# Patient Record
Sex: MALE | NOT HISPANIC OR LATINO | URBAN - METROPOLITAN AREA
[De-identification: names, ages, dates, MRNs, and addresses within clinical notes are randomized per-mention and may not be internally consistent; named-entity substitution may affect disease eponyms.]

---

## 2017-10-30 ENCOUNTER — IMPORTED ENCOUNTER (OUTPATIENT)
Dept: URBAN - METROPOLITAN AREA CLINIC 1 | Facility: CLINIC | Age: 72
End: 2017-10-30

## 2017-10-30 PROBLEM — H16.143: Noted: 2017-10-30

## 2017-10-30 PROBLEM — H40.023: Noted: 2017-10-30

## 2017-10-30 PROBLEM — H10.45: Noted: 2017-10-30

## 2017-10-30 PROBLEM — H04.123: Noted: 2017-10-30

## 2017-10-30 PROBLEM — H43.813: Noted: 2017-10-30

## 2017-10-30 PROBLEM — Z96.1: Noted: 2017-10-30

## 2017-10-30 PROBLEM — H26.493: Noted: 2017-10-30

## 2017-10-30 PROBLEM — R73.09: Noted: 2017-10-30

## 2017-10-30 PROCEDURE — 92014 COMPRE OPH EXAM EST PT 1/>: CPT

## 2017-10-30 PROCEDURE — 92083 EXTENDED VISUAL FIELD XM: CPT

## 2017-10-30 NOTE — PATIENT DISCUSSION
1.  DM Type II (Diet Controlled) without sign of diabetic retinopathy and no blot heme on dilated retinal examination today OU No Macular Edema:  Discussed the pathophysiology of diabetes and its effect on the eye and risk of blindness. Stressed the importance of strong glucose control. Advised of importance of at least yearly dilated examinations but to contact us immediately for any problems or concerns. 2. STEPHANIE w/ PEK OU- Use ATs TID OU Routinely. Consider Plugs without improvement. 3.  Allergic Conjunctivitis OU- Observe. 4.  PVD OU - RD precautions. 5.  PCO OU: (Posterior Capsule Opacification)   Observe  6. Pseudophakia OU - (Standard OU) 7. COAG Suspect OU (CD 0.85 OU) Neg Fm Hx. Risk of cupping. VF WNL OU Today. Will continue to observe on no meds. Return for an appointment in 6 mo 10 OCT (Check K) with Dr. Shoaib Allan.

## 2018-04-30 ENCOUNTER — IMPORTED ENCOUNTER (OUTPATIENT)
Dept: URBAN - METROPOLITAN AREA CLINIC 1 | Facility: CLINIC | Age: 73
End: 2018-04-30

## 2018-04-30 PROBLEM — H26.493: Noted: 2018-04-30

## 2018-04-30 PROBLEM — H10.45: Noted: 2018-04-30

## 2018-04-30 PROBLEM — H40.023: Noted: 2018-04-30

## 2018-04-30 PROBLEM — H16.143: Noted: 2018-04-30

## 2018-04-30 PROBLEM — Z96.1: Noted: 2018-04-30

## 2018-04-30 PROBLEM — H04.123: Noted: 2018-04-30

## 2018-04-30 PROCEDURE — 92133 CPTRZD OPH DX IMG PST SGM ON: CPT

## 2018-04-30 PROCEDURE — 92012 INTRM OPH EXAM EST PATIENT: CPT

## 2018-04-30 NOTE — PATIENT DISCUSSION
1.  Glaucoma Suspect OU (0.85 OU): Stable IOP OU. Essentially normal OCT OU. Patient is considered high risk. Condition was discussed with patient and patient understands. Will continue to monitor patient for any progression in condition. Patient was advised to call us with any problems questions or concerns. 2.  STEPHANIE w/ PEK OU-The increase of artificial tears OU to TID were recommended. Consider plugs if no improvement3. Pseudophakia OU- Doing well. 4.  PCO OU: Observe and consider yag cap when pt feels pco visually significant and visual acuity decreases to appropriate level. 5. Allergic Conjunctivitis OU- Controlled. The condition was  discussed with the patient. Avoidance of allergens and cool compresses were recommended. 6. H/o DM w/o DR Jalen Belcher. Return for an appointment for a 30/HVF in 6 months with Dr. Shoaib Allan.

## 2018-04-30 NOTE — PATIENT DISCUSSION
PCO OU: (Posterior Capsule Opacification)   Observe and consider yag cap when pt feels pco visually significant and visual acuity decreases to appropriate level.

## 2018-05-22 NOTE — PATIENT DISCUSSION
PITUITARY ADENOMA COUNSELING:  I have explained the nature of pituitary adenoma and the potential for permanent visual loss from compressive optic neuropathy. The patient is instructed to continue regular visits to the eye doctor for testing of visual function.

## 2018-10-30 ENCOUNTER — IMPORTED ENCOUNTER (OUTPATIENT)
Dept: URBAN - METROPOLITAN AREA CLINIC 1 | Facility: CLINIC | Age: 73
End: 2018-10-30

## 2018-10-30 PROBLEM — H02.831: Noted: 2018-10-30

## 2018-10-30 PROBLEM — E11.9: Noted: 2018-10-30

## 2018-10-30 PROBLEM — H40.013: Noted: 2018-10-30

## 2018-10-30 PROBLEM — Z96.1: Noted: 2018-10-30

## 2018-10-30 PROBLEM — H04.123: Noted: 2018-10-30

## 2018-10-30 PROBLEM — Z79.84: Noted: 2018-10-30

## 2018-10-30 PROBLEM — H43.813: Noted: 2018-10-30

## 2018-10-30 PROBLEM — H02.834: Noted: 2018-10-30

## 2018-10-30 PROBLEM — H26.491: Noted: 2018-10-30

## 2018-10-30 PROBLEM — H10.45: Noted: 2018-10-30

## 2018-10-30 PROBLEM — H16.143: Noted: 2018-10-30

## 2018-10-30 PROBLEM — H26.493: Noted: 2018-10-30

## 2018-10-30 PROCEDURE — 92014 COMPRE OPH EXAM EST PT 1/>: CPT

## 2018-10-30 PROCEDURE — 92083 EXTENDED VISUAL FIELD XM: CPT

## 2018-10-30 NOTE — PATIENT DISCUSSION
1.  DM Type II without sign of diabetic retinopathy and no blot heme on dilated retinal examination today OU No Macular Edema: Stable. Discussed the pathophysiology of diabetes and its effect on the eye and risk of blindness. Stressed the importance of strong glucose control. Advised of importance of at least yearly dilated examinations but to contact us immediately for any problems or concerns. 2. Type II diabetes controlled by oral medications. 3.  Glaucoma Suspect OU (0.85 OU): Stable IOP and c/D OU. Normal HVF OS. HVF defect OD likely artifact. Patient is considered Low Risk. Condition was discussed with patient and patient understands. Will continue to monitor patient for any progression in condition. Patient was advised to call us with any problems questions or concerns. 4.  STEPHANIE w/ PEK OU- No improvement. The increase of artificial tears were recommended. Consider plugs w/o improvement. 5. PCO OD>OS: Observe and consider yag cap when pt feels pco visually significant and visual acuity decreases to appropriate level. 6. Allergic Conjunctivitis OU- The condition was  discussed with the patient. Avoidance of allergens and cool compresses were recommended. 7. PVD OU - Old stable 8. Dermatochalasis OU UL's  - Follow with no intervention at this time. 9. Pseudophakia OU - Doing well.

## 2019-04-30 ENCOUNTER — IMPORTED ENCOUNTER (OUTPATIENT)
Dept: URBAN - METROPOLITAN AREA CLINIC 1 | Facility: CLINIC | Age: 74
End: 2019-04-30

## 2019-04-30 PROBLEM — H10.45: Noted: 2019-04-30

## 2019-04-30 PROBLEM — H02.834: Noted: 2019-04-30

## 2019-04-30 PROBLEM — H02.831: Noted: 2019-04-30

## 2019-04-30 PROBLEM — H26.493: Noted: 2019-04-30

## 2019-04-30 PROBLEM — H04.123: Noted: 2019-04-30

## 2019-04-30 PROBLEM — Z96.1: Noted: 2019-04-30

## 2019-04-30 PROBLEM — H16.143: Noted: 2019-04-30

## 2019-04-30 PROBLEM — H40.013: Noted: 2019-04-30

## 2019-04-30 PROCEDURE — 99213 OFFICE O/P EST LOW 20 MIN: CPT

## 2019-04-30 NOTE — PATIENT DISCUSSION
1.  STEPHANIE w/ PEK OU- Improved cont ATs TID OU routinely 2. Glaucoma Suspect OU (CD 0.85 OU):  Patient is considered Low Risk. 3.  Allergic Conjunctivitis OU -- The condition was  discussed with the patient. Avoidance of allergens and cool compresses were recommended. 4. PCO OU: (Posterior Capsule Opacification)   Observe and consider yag cap when pt feels pco visually significant and visual acuity decreases to appropriate level. 5. Pseudophakia OU - Doing Well 6. Dermatochalasis OU UL's  - Follow with no intervention at this time. 7. H/o DM w/o DR Liliam Arambula. H/o PVD OUReturn for an appointment in October 30/OCT with Dr. Sheela Mcnally.

## 2019-10-10 ENCOUNTER — IMPORTED ENCOUNTER (OUTPATIENT)
Dept: URBAN - METROPOLITAN AREA CLINIC 1 | Facility: CLINIC | Age: 74
End: 2019-10-10

## 2019-10-10 PROBLEM — Z79.84: Noted: 2019-10-10

## 2019-10-10 PROBLEM — H04.123: Noted: 2019-10-10

## 2019-10-10 PROBLEM — H40.013: Noted: 2019-10-10

## 2019-10-10 PROBLEM — E11.9: Noted: 2019-10-10

## 2019-10-10 PROBLEM — H16.143: Noted: 2019-10-10

## 2019-10-10 PROCEDURE — 92014 COMPRE OPH EXAM EST PT 1/>: CPT

## 2019-10-10 PROCEDURE — 92133 CPTRZD OPH DX IMG PST SGM ON: CPT

## 2019-10-10 NOTE — PATIENT DISCUSSION
1.  DM Type II (Oral Meds) without sign of diabetic retinopathy and no blot heme on dilated retinal examination today OU No Macular Edema:  Discussed the pathophysiology of diabetes and its effect on the eye and risk of blindness. Stressed the importance of strong glucose control. Advised of importance of at least yearly dilated examinations but to contact us immediately for any problems or concerns. 2. Glaucoma Suspect OU (CD 0.85 OU) Neg Fm Hx. Risk of cupping. OCT WNL OU today IOP WNL on no gtts. Cont to observe off gtts. 3.  STEPHANIE w/ increased PEK OU- Increase ATs to QID OU Routinely. Consider Plugs vs. Restasis w/o improvement. 4.  Allergic Conjunctivitis OU -- Cont Zaditor BID OU PRN for allergies. 5.  PCO OU: (Posterior Capsule Opacification)   Observe  6. Pseudophakia OU - Doing Well 7. Dermatochalasis OU UL's  - observe. 8.  PVD OU- stable RD precautions. Letter to PCP MRx deferred Return for an appointment in 6 mo 10 (Check K) with Dr. Judy Kumar.

## 2020-04-14 ENCOUNTER — IMPORTED ENCOUNTER (OUTPATIENT)
Dept: URBAN - METROPOLITAN AREA CLINIC 1 | Facility: CLINIC | Age: 75
End: 2020-04-14

## 2020-04-14 PROBLEM — H04.123: Noted: 2020-04-14

## 2020-04-14 PROBLEM — H16.143: Noted: 2020-04-14

## 2020-04-14 PROBLEM — H10.45: Noted: 2020-04-14

## 2020-04-14 PROCEDURE — 99213 OFFICE O/P EST LOW 20 MIN: CPT

## 2020-04-14 NOTE — PATIENT DISCUSSION
1.   STEPHANIE w/  PEK OU -- PEK improved with compliance. Cont ATs to QID OU Routinely. Hold on Plugs/ Restasis at this time. 2.  Allergic Conjunctivitis OU -- Cont Zaditor BID OU PRN for allergies. 3.  PCO OU: (Posterior Capsule Opacification)   Observe  4. Pseudophakia OU - Doing Well 5. Dermatochalasis OU UL's  - observe. 6.  H/o PVD OU 7. H/o DM Type II (Oral Meds) without sign of diabetic retinopathy and no blot heme on dilated retinal examination today OU No Macular Edema 8. Glaucoma Suspect OU (CD 0.85 OU) Neg Fm Hx. Risk of cupping. Cont to observe off gtts. Return for an appointment in 6 mo 30/OCT with Dr. Alla Sales.

## 2020-11-09 ENCOUNTER — IMPORTED ENCOUNTER (OUTPATIENT)
Dept: URBAN - METROPOLITAN AREA CLINIC 1 | Facility: CLINIC | Age: 75
End: 2020-11-09

## 2020-11-09 PROBLEM — H16.143: Noted: 2020-11-09

## 2020-11-09 PROBLEM — H40.013: Noted: 2020-11-09

## 2020-11-09 PROBLEM — E11.9: Noted: 2020-11-09

## 2020-11-09 PROBLEM — Z79.84: Noted: 2020-11-09

## 2020-11-09 PROBLEM — H04.123: Noted: 2020-11-09

## 2020-11-09 PROCEDURE — 92014 COMPRE OPH EXAM EST PT 1/>: CPT

## 2020-11-09 PROCEDURE — 92133 CPTRZD OPH DX IMG PST SGM ON: CPT

## 2020-11-09 NOTE — PATIENT DISCUSSION
1.  DM Type II (Oral Meds) without sign of diabetic retinopathy and no blot heme on dilated retinal examination today OU No Macular Edema:  Discussed the pathophysiology of diabetes and its effect on the eye and risk of blindness. Stressed the importance of strong glucose control. Advised of importance of at least yearly dilated examinations but to contact us immediately for any problems or concerns. 2. Glaucoma Suspect OU (CD 0.85 OU) - OCT WNL OU. Neg Fm Hx. Risk of cupping. Cont to observe off gtts. Patient is considered Low Risk. Condition was discussed with patient and patient understands. Will continue to monitor patient for any progression in condition. Patient was advised to call us with any problems questions or concerns. 3.  STEPHANIE w/ PEK OU - Recommend ATs QID OU Routinely. Hold on Plugs/ Restasis at this time. Add Restasis at next visit w/o improvement. 4.  Allergic Conjunctivitis OU -- Cont Zaditor BID OU PRN for allergies. 5.  PCO OU: (Posterior Capsule Opacification) - Observe. 6.  Pseudophakia OU - Doing Well 7. PVD OU - old stable. RD precautions. 8. Dermatochalasis OU UL's  - observe. \ Patient deferred [facility] Rx today. Return for an appointment in 6 months 10 (k check) with Dr. Rafi Briscoe.

## 2021-05-10 ENCOUNTER — IMPORTED ENCOUNTER (OUTPATIENT)
Dept: URBAN - METROPOLITAN AREA CLINIC 1 | Facility: CLINIC | Age: 76
End: 2021-05-10

## 2021-05-10 PROBLEM — H04.123: Noted: 2021-05-10

## 2021-05-10 PROBLEM — H16.143: Noted: 2021-05-10

## 2021-05-10 PROCEDURE — 92012 INTRM OPH EXAM EST PATIENT: CPT

## 2021-05-10 NOTE — PATIENT DISCUSSION
1.  STEPHANIE w/ PEK OU -- PEK persist despite compliance with routine use of ATs. Begin Restasis BID OU. Recommend instill AT prior to Restasis dose. Cont ATs QID OU Routinely. 2.  Allergic Conjunctivitis OU -- Cont Zaditor BID OU PRN for allergies. 3.  PCO OU -- (Posterior Capsule Opacification) Observe. 4.  Pseudophakia OU -- Doing Well 5. Glaucoma Suspect OU (CD 0.85 OU) -- Neg Fm Hx. Risk of cupping. Cont to observe off gtts. Patient is considered Low Risk. 6.  H/o DM Type II (Oral Meds) w/o  7.  H/o PVD OU 8. Dermatochalasis OU UL's  -- Observe Return for an appointment in 3 months 10 (k check on restasis *NEW*) with Dr. Aline Martinez.

## 2021-08-12 ENCOUNTER — IMPORTED ENCOUNTER (OUTPATIENT)
Dept: URBAN - METROPOLITAN AREA CLINIC 1 | Facility: CLINIC | Age: 76
End: 2021-08-12

## 2021-08-12 PROBLEM — H04.123: Noted: 2021-08-12

## 2021-08-12 PROBLEM — E11.9: Noted: 2021-08-12

## 2021-08-12 PROBLEM — H16.143: Noted: 2021-08-12

## 2021-08-12 PROBLEM — E11.3292: Noted: 2021-08-12

## 2021-08-12 PROBLEM — H26.493: Noted: 2021-08-12

## 2021-08-12 PROCEDURE — 92012 INTRM OPH EXAM EST PATIENT: CPT

## 2021-08-12 PROCEDURE — 92015 DETERMINE REFRACTIVE STATE: CPT

## 2021-08-12 NOTE — PATIENT DISCUSSION
1.  STEPHANIE w/ PEK OU -- PEK much improved OU on Restasis. Continue Restasis BID OU and PF ATs QID OU routinely. *Recommend instilling AT prior to Restasis. 2.  PCO OU -- Visually Significant *secondary to glare*; schedule YAG Cap. Pros cons risks and benefits. 3.  DM Type II (Oral Med) with Mild Nonproliferative Diabetic Retinopathy OS No Macular Edema: MAs seen with today's exam (new). Discussed the pathophysiology of diabetes and its effect on the eye and risk of blindness. Stressed the importance of strong glucose control. Advised of importance of at least yearly dilated examinations but to contact us immediately for any problems or concerns. 4.  DM Type II (Oral Med) without sign of diabetic retinopathy and no blot heme on dilated retinal examination today OD No Macular Edema:  Discussed the pathophysiology of diabetes and its effect on the eye and risk of blindness. Stressed the importance of strong glucose control. Advised of importance of at least yearly dilated examinations but to contact us immediately for any problems or concerns. 3. Allergic Conjunctivitis OU -- Cont Zaditor BID OU PRN for allergies. 4.  Pseudophakia OU -- Doing Well 5. Glaucoma Suspect OU (CD 0.85 OU) -- Neg Fm Hx. Risk of cupping. Cont to observe off gtts. Patient is considered Low Risk. 7.  PVD OU 8. Dermatochalasis OU UL's  -- Observe Return for an appointment YAG Cap OD then OS with Dr. Byron Gomez.

## 2021-08-12 NOTE — PATIENT DISCUSSION
DM Type II without sign of diabetic retinopathy and no blot heme on dilated retinal examination today OD No Macular Edema:  Discussed the pathophysiology of diabetes and its effect on the eye and risk of blindness. Stressed the importance of strong glucose control. Advised of importance of at least yearly dilated examinations but to contact us immediately for any problems or concerns.

## 2021-09-10 ENCOUNTER — IMPORTED ENCOUNTER (OUTPATIENT)
Dept: URBAN - METROPOLITAN AREA CLINIC 1 | Facility: CLINIC | Age: 76
End: 2021-09-10

## 2021-09-10 PROBLEM — H26.491: Noted: 2021-09-10

## 2021-09-10 PROCEDURE — 66821 AFTER CATARACT LASER SURGERY: CPT

## 2021-10-01 ENCOUNTER — IMPORTED ENCOUNTER (OUTPATIENT)
Dept: URBAN - METROPOLITAN AREA CLINIC 1 | Facility: CLINIC | Age: 76
End: 2021-10-01

## 2021-10-01 PROBLEM — H26.492: Noted: 2021-10-01

## 2021-10-01 PROCEDURE — 66821 AFTER CATARACT LASER SURGERY: CPT

## 2021-10-01 NOTE — PATIENT DISCUSSION
YAG CAP OS: (Consent signed and scanned into attachments) 1 gtt Prolensa applied. The purpose and nature of the procedure possible alternative methods of treatment the risks involved and the possibility of complications were discussed with patient. The Patient wishes to proceed and the consent was signed. The laser was then performed under topical anesthesia with no complications. Post op instructions were given to patient as well as a follow-up appointment. Patient was advised to call our office if any questions or concerns. Return for an appointment in 1-4 weeks po/yag with Dr. Kyra Castellanos.

## 2021-10-29 ENCOUNTER — IMPORTED ENCOUNTER (OUTPATIENT)
Dept: URBAN - METROPOLITAN AREA CLINIC 1 | Facility: CLINIC | Age: 76
End: 2021-10-29

## 2021-10-29 PROCEDURE — 99024 POSTOP FOLLOW-UP VISIT: CPT

## 2021-10-29 NOTE — PATIENT DISCUSSION
PO YAG Cap OU: good result. MRX given. Return for an appointment in 4 months 27 with Dr. Bennie Johns.

## 2021-12-01 ENCOUNTER — IMPORTED ENCOUNTER (OUTPATIENT)
Dept: URBAN - METROPOLITAN AREA CLINIC 1 | Facility: CLINIC | Age: 76
End: 2021-12-01

## 2021-12-01 PROBLEM — B30.9: Noted: 2021-12-01

## 2021-12-01 PROCEDURE — 92012 INTRM OPH EXAM EST PATIENT: CPT

## 2021-12-01 NOTE — PATIENT DISCUSSION
1.  Viral Conjunctivitis OU -- Increase PF ATs to Q2Hrs OU. Begin Pred TID OU (Erx'd). The condition was discussed with the patient. Cool compresses and artificial tears were recommended. The mechanism of transmission was explained and the patient was educated to wash hands and use separate towels and bedding. 2. STEPHANIE w/ PEK OU -- Hold on Restasis BID OU. Increase PF ATs to Q2Hrs OU routinely. 3.  Allergic Conjunctivitis OU -- Cont Zaditor BID OU PRN for allergies. 4.  Pseudophakia OU -- H/o YAG Cap OU 5.  Glaucoma Suspect OU (CD 0.85 OU) -- Neg Fm Hx. Risk of cupping. Cont to observe off gtts. Patient is considered Low Risk. 6.  Dermatochalasis OU UL's  -- Observe 7. H/o DM Type II (Oral Med) w/o DR OD 8. H/o DM Type II (Oral Med) with Mild NPDR OS9.  H/o PVD OU Return for an appointment in 1 week 10 with Dr. Bobo Awan.

## 2021-12-08 ENCOUNTER — IMPORTED ENCOUNTER (OUTPATIENT)
Dept: URBAN - METROPOLITAN AREA CLINIC 1 | Facility: CLINIC | Age: 76
End: 2021-12-08

## 2021-12-08 PROBLEM — B30.9: Noted: 2021-12-08

## 2021-12-08 PROBLEM — INACTIVE: Noted: 2021-12-08

## 2021-12-08 PROCEDURE — 92012 INTRM OPH EXAM EST PATIENT: CPT

## 2021-12-08 NOTE — PATIENT DISCUSSION
1.  Viral Conjunctivitis OU -- Taper Pred to BID OU x 1 week QD OU x 1 week then D/c. The condition was discussed with the patient. Cool compresses and artificial tears were recommended. The mechanism of transmission was explained and the patient was educated to wash hands and use separate towels and bedding. 2. STEPHANIE w/ PEK OU -- Restart Restasis BID OU. Increase PF ATs to Q2Hrs OU routinely. 3.  Allergic Conjunctivitis OU -- Cont Zaditor BID OU PRN for allergies. 4.  Pseudophakia OU -- H/o YAG Cap OU 5.  Glaucoma Suspect OU (CD 0.85 OU) -- Neg Fm Hx. Risk of cupping. Cont to observe off gtts. Patient is considered Low Risk. 6.  Dermatochalasis OU UL's  -- Observe 7. H/o DM Type II (Oral Med) w/o DR OD 8. H/o DM Type II (Oral Med) with Mild NPDR OS9.  H/o PVD OU Return for an appointment as scheduled (3/18/22 - 30) with Dr. Rafi Briscoe.

## 2022-03-12 ASSESSMENT — VISUAL ACUITY
OD_CC: 20/30-3
OS_CC: 20/25-2
OD_CC: 20/25-2
OD_CC: 20/20-2
OS_CC: 20/20
OS_CC: 20/20
OS_CC: 20/20-1
OS_CC: 20/30
OD_GLARE: 20/50
OD_CC: 20/25-1
OS_CC: 20/20
OS_CC: 20/20
OD_CC: 20/20-1
OD_CC: 20/25-2
OS_GLARE: 20/50
OD_CC: 20/20
OD_CC: 20/25
OS_CC: 20/25-1
OD_CC: 20/30-2
OS_CC: 20/20-1
OD_PH: SC 20/20
OD_CC: 20/20
OD_CC: 20/40
OS_CC: 20/25-1
OS_CC: 20/20
OS_GLARE: 20/50
OD_GLARE: 20/400
OS_CC: 20/20-1
OD_CC: 20/30

## 2022-03-12 ASSESSMENT — TONOMETRY
OD_IOP_MMHG: 10
OD_IOP_MMHG: 14
OD_IOP_MMHG: 13
OS_IOP_MMHG: 11
OS_IOP_MMHG: 13
OS_IOP_MMHG: 12
OD_IOP_MMHG: 14
OS_IOP_MMHG: 13
OS_IOP_MMHG: 13
OD_IOP_MMHG: 10
OD_IOP_MMHG: 13
OS_IOP_MMHG: 14
OS_IOP_MMHG: 13
OS_IOP_MMHG: 14
OS_IOP_MMHG: 13
OS_IOP_MMHG: 10
OD_IOP_MMHG: 14
OS_IOP_MMHG: 13
OD_IOP_MMHG: 10
OD_IOP_MMHG: 13
OS_IOP_MMHG: 10
OD_IOP_MMHG: 13

## 2022-10-11 ENCOUNTER — COMPREHENSIVE EXAM (OUTPATIENT)
Dept: URBAN - METROPOLITAN AREA CLINIC 1 | Facility: CLINIC | Age: 77
End: 2022-10-11

## 2022-10-11 DIAGNOSIS — H40.013: ICD-10-CM

## 2022-10-11 DIAGNOSIS — H04.123: ICD-10-CM

## 2022-10-11 DIAGNOSIS — Z96.1: ICD-10-CM

## 2022-10-11 DIAGNOSIS — E11.9: ICD-10-CM

## 2022-10-11 PROCEDURE — 99214 OFFICE O/P EST MOD 30 MIN: CPT

## 2022-10-11 PROCEDURE — 92015 DETERMINE REFRACTIVE STATE: CPT

## 2022-10-11 ASSESSMENT — VISUAL ACUITY
OS_CC: 20/30+2
OU_CC: J1
OD_SC: 20/30-1
OS_SC: 20/40-1
OD_CC: 20/20-1

## 2022-10-11 ASSESSMENT — TONOMETRY
OD_IOP_MMHG: 10
OS_IOP_MMHG: 10

## 2022-10-11 NOTE — PATIENT DISCUSSION
Patient did not restart restasis at last visit. Patient says that the cost was too much. Continue at this time without treatment and will reconsider in the future. Recommend ATs at least TID OU (says he uses 12 times per day. ).

## 2023-07-21 ENCOUNTER — EMERGENCY VISIT (OUTPATIENT)
Dept: URBAN - METROPOLITAN AREA CLINIC 1 | Facility: CLINIC | Age: 78
End: 2023-07-21

## 2023-07-21 DIAGNOSIS — H49.11: ICD-10-CM

## 2023-07-21 PROCEDURE — 92012 INTRM OPH EXAM EST PATIENT: CPT

## 2023-07-21 ASSESSMENT — VISUAL ACUITY
OD_SC: J5
OS_SC: J7
OS_SC: 20/25-1
OD_SC: 20/25-1

## 2023-07-21 ASSESSMENT — TONOMETRY
OS_IOP_MMHG: 10
OD_IOP_MMHG: 10

## 2023-08-14 ENCOUNTER — FOLLOW UP (OUTPATIENT)
Dept: URBAN - METROPOLITAN AREA CLINIC 1 | Facility: CLINIC | Age: 78
End: 2023-08-14

## 2023-08-14 DIAGNOSIS — H49.11: ICD-10-CM

## 2023-08-14 PROCEDURE — 99213 OFFICE O/P EST LOW 20 MIN: CPT

## 2023-08-14 ASSESSMENT — VISUAL ACUITY
OS_SC: 20/25-2
OU_SC: J3
OD_SC: 20/30

## 2023-08-14 ASSESSMENT — TONOMETRY
OS_IOP_MMHG: 09
OD_IOP_MMHG: 10

## 2023-09-07 ENCOUNTER — HOSPITAL ENCOUNTER (OUTPATIENT)
Dept: NON INVASIVE DIAGNOSTICS | Facility: HOSPITAL | Age: 78
Discharge: HOME | End: 2023-09-10

## 2023-09-07 DIAGNOSIS — I48.19 PERSISTENT ATRIAL FIBRILLATION (HCC): ICD-10-CM

## 2023-09-09 LAB
EKG DIAGNOSIS: NORMAL
EKG Q-T INTERVAL: 408 MS
EKG QRS DURATION: 94 MS
EKG QTC CALCULATION (BAZETT): 437 MS
EKG R AXIS: 40 DEGREES
EKG T AXIS: 50 DEGREES
EKG VENTRICULAR RATE: 69 BPM

## 2023-11-17 ENCOUNTER — TRANSCRIBE ORDERS (OUTPATIENT)
Facility: HOSPITAL | Age: 78
End: 2023-11-17

## 2023-11-17 DIAGNOSIS — S32.040A CLOSED WEDGE COMPRESSION FRACTURE OF L4 VERTEBRA, INITIAL ENCOUNTER (HCC): ICD-10-CM

## 2023-11-17 DIAGNOSIS — M54.50 LUMBAR PAIN: Primary | ICD-10-CM

## 2023-11-29 ENCOUNTER — HOSPITAL ENCOUNTER (OUTPATIENT)
Age: 78
Discharge: HOME | End: 2023-12-02
Payer: MEDICARE

## 2023-11-29 DIAGNOSIS — M54.50 LUMBAR PAIN: ICD-10-CM

## 2023-11-29 DIAGNOSIS — S32.040A CLOSED WEDGE COMPRESSION FRACTURE OF L4 VERTEBRA, INITIAL ENCOUNTER (HCC): ICD-10-CM

## 2023-11-29 PROCEDURE — 72148 MRI LUMBAR SPINE W/O DYE: CPT

## 2023-12-12 ENCOUNTER — HOSPITAL ENCOUNTER (OUTPATIENT)
Facility: HOSPITAL | Age: 78
Discharge: HOME | End: 2023-12-15
Attending: INTERNAL MEDICINE
Payer: MEDICARE

## 2023-12-12 DIAGNOSIS — N18.32 STAGE 3B CHRONIC KIDNEY DISEASE (HCC): ICD-10-CM

## 2023-12-12 PROCEDURE — 76770 US EXAM ABDO BACK WALL COMP: CPT

## 2024-03-07 ENCOUNTER — TRANSCRIBE ORDERS (OUTPATIENT)
Facility: HOSPITAL | Age: 79
End: 2024-03-07

## 2024-03-07 DIAGNOSIS — M80.08XG AGE-RELATED OSTEOPOROSIS WITH CURRENT PATHOLOGICAL FRACTURE OF VERTEBRA WITH DELAYED HEALING: ICD-10-CM

## 2024-03-07 DIAGNOSIS — S32.040S WEDGE COMPRESSION FRACTURE OF FOURTH LUMBAR VERTEBRA, SEQUELA: Primary | ICD-10-CM

## 2024-03-12 ENCOUNTER — TRANSCRIBE ORDERS (OUTPATIENT)
Facility: HOSPITAL | Age: 79
End: 2024-03-12

## 2024-03-12 DIAGNOSIS — M51.36 LUMBAR DEGENERATIVE DISC DISEASE: ICD-10-CM

## 2024-03-12 DIAGNOSIS — M47.816 LUMBAR SPONDYLOSIS: ICD-10-CM

## 2024-03-12 DIAGNOSIS — S32.040S CLOSED WEDGE COMPRESSION FRACTURE OF L4 VERTEBRA, SEQUELA: ICD-10-CM

## 2024-03-12 DIAGNOSIS — M48.061 LUMBAR FORAMINAL STENOSIS: ICD-10-CM

## 2024-03-12 DIAGNOSIS — M51.16 LUMBAR DISC DISEASE WITH RADICULOPATHY: Primary | ICD-10-CM

## 2024-03-12 DIAGNOSIS — S32.010S CLOSED WEDGE COMPRESSION FRACTURE OF L1 VERTEBRA, SEQUELA: ICD-10-CM

## 2024-03-22 ENCOUNTER — HOSPITAL ENCOUNTER (OUTPATIENT)
Age: 79
End: 2024-03-22
Payer: MEDICARE

## 2024-03-22 DIAGNOSIS — M51.16 LUMBAR DISC DISEASE WITH RADICULOPATHY: ICD-10-CM

## 2024-03-22 DIAGNOSIS — M47.816 LUMBAR SPONDYLOSIS: ICD-10-CM

## 2024-03-22 DIAGNOSIS — S32.010S CLOSED WEDGE COMPRESSION FRACTURE OF L1 VERTEBRA, SEQUELA: ICD-10-CM

## 2024-03-22 DIAGNOSIS — S32.040S CLOSED WEDGE COMPRESSION FRACTURE OF L4 VERTEBRA, SEQUELA: ICD-10-CM

## 2024-03-22 DIAGNOSIS — M48.061 LUMBAR FORAMINAL STENOSIS: ICD-10-CM

## 2024-03-22 DIAGNOSIS — M51.36 LUMBAR DEGENERATIVE DISC DISEASE: ICD-10-CM

## 2024-03-22 PROCEDURE — 72148 MRI LUMBAR SPINE W/O DYE: CPT

## 2024-04-22 ENCOUNTER — HOSPITAL ENCOUNTER (OUTPATIENT)
Facility: HOSPITAL | Age: 79
Discharge: HOME | End: 2024-04-22
Attending: STUDENT IN AN ORGANIZED HEALTH CARE EDUCATION/TRAINING PROGRAM | Admitting: STUDENT IN AN ORGANIZED HEALTH CARE EDUCATION/TRAINING PROGRAM
Payer: MEDICARE

## 2024-04-22 ENCOUNTER — ANESTHESIA EVENT (OUTPATIENT)
Facility: HOSPITAL | Age: 79
End: 2024-04-22
Payer: MEDICARE

## 2024-04-22 ENCOUNTER — ANESTHESIA (OUTPATIENT)
Facility: HOSPITAL | Age: 79
End: 2024-04-22
Payer: MEDICARE

## 2024-04-22 VITALS
DIASTOLIC BLOOD PRESSURE: 91 MMHG | HEIGHT: 71 IN | TEMPERATURE: 98.2 F | BODY MASS INDEX: 41.3 KG/M2 | SYSTOLIC BLOOD PRESSURE: 161 MMHG | RESPIRATION RATE: 22 BRPM | WEIGHT: 295 LBS | HEART RATE: 78 BPM | OXYGEN SATURATION: 91 %

## 2024-04-22 DIAGNOSIS — M80.08XA AGE-RELATED OSTEOPOROSIS WITH CURRENT PATHOLOGICAL FRACTURE OF VERTEBRA, INITIAL ENCOUNTER (HCC): ICD-10-CM

## 2024-04-22 PROCEDURE — 2720000010 IR KYPHOPLASTY LUMBAR 1 VERTEBRAL BODY

## 2024-04-22 PROCEDURE — 2500000003 HC RX 250 WO HCPCS: Performed by: STUDENT IN AN ORGANIZED HEALTH CARE EDUCATION/TRAINING PROGRAM

## 2024-04-22 PROCEDURE — 6360000002 HC RX W HCPCS: Performed by: STUDENT IN AN ORGANIZED HEALTH CARE EDUCATION/TRAINING PROGRAM

## 2024-04-22 PROCEDURE — 2580000003 HC RX 258

## 2024-04-22 PROCEDURE — 6360000002 HC RX W HCPCS

## 2024-04-22 PROCEDURE — 2500000003 HC RX 250 WO HCPCS

## 2024-04-22 RX ORDER — HYDROCODONE BITARTRATE AND ACETAMINOPHEN 5; 325 MG/1; MG/1
TABLET ORAL
COMMUNITY
Start: 2024-04-08

## 2024-04-22 RX ORDER — LIDOCAINE HYDROCHLORIDE 20 MG/ML
INJECTION, SOLUTION EPIDURAL; INFILTRATION; INTRACAUDAL; PERINEURAL PRN
Status: DISCONTINUED | OUTPATIENT
Start: 2024-04-22 | End: 2024-04-22 | Stop reason: SDUPTHER

## 2024-04-22 RX ORDER — METHOCARBAMOL 500 MG/1
TABLET, FILM COATED ORAL
COMMUNITY

## 2024-04-22 RX ORDER — KETAMINE HCL IN NACL, ISO-OSM 100MG/10ML
SYRINGE (ML) INJECTION PRN
Status: DISCONTINUED | OUTPATIENT
Start: 2024-04-22 | End: 2024-04-22 | Stop reason: SDUPTHER

## 2024-04-22 RX ORDER — LIDOCAINE HYDROCHLORIDE 10 MG/ML
INJECTION, SOLUTION EPIDURAL; INFILTRATION; INTRACAUDAL; PERINEURAL PRN
Status: COMPLETED | OUTPATIENT
Start: 2024-04-22 | End: 2024-04-22

## 2024-04-22 RX ORDER — SODIUM CHLORIDE, SODIUM LACTATE, POTASSIUM CHLORIDE, CALCIUM CHLORIDE 600; 310; 30; 20 MG/100ML; MG/100ML; MG/100ML; MG/100ML
INJECTION, SOLUTION INTRAVENOUS CONTINUOUS PRN
Status: DISCONTINUED | OUTPATIENT
Start: 2024-04-22 | End: 2024-04-22 | Stop reason: SDUPTHER

## 2024-04-22 RX ORDER — MIDAZOLAM HYDROCHLORIDE 1 MG/ML
INJECTION INTRAMUSCULAR; INTRAVENOUS PRN
Status: DISCONTINUED | OUTPATIENT
Start: 2024-04-22 | End: 2024-04-22 | Stop reason: SDUPTHER

## 2024-04-22 RX ORDER — APIXABAN 5 MG/1
1 TABLET, FILM COATED ORAL 2 TIMES DAILY
COMMUNITY
Start: 2023-05-23

## 2024-04-22 RX ORDER — SPIRONOLACTONE 25 MG/1
TABLET ORAL
COMMUNITY
Start: 2023-05-23

## 2024-04-22 RX ORDER — BUPIVACAINE HYDROCHLORIDE 5 MG/ML
INJECTION, SOLUTION EPIDURAL; INTRACAUDAL PRN
Status: COMPLETED | OUTPATIENT
Start: 2024-04-22 | End: 2024-04-22

## 2024-04-22 RX ADMIN — LIDOCAINE HYDROCHLORIDE 100 MG: 20 INJECTION, SOLUTION EPIDURAL; INFILTRATION; INTRACAUDAL; PERINEURAL at 10:32

## 2024-04-22 RX ADMIN — Medication 20 MG: at 10:32

## 2024-04-22 RX ADMIN — PROPOFOL 50 MCG/KG/MIN: 10 INJECTION, EMULSION INTRAVENOUS at 10:32

## 2024-04-22 RX ADMIN — SODIUM CHLORIDE, SODIUM LACTATE, POTASSIUM CHLORIDE, AND CALCIUM CHLORIDE: 600; 310; 30; 20 INJECTION, SOLUTION INTRAVENOUS at 10:24

## 2024-04-22 RX ADMIN — LIDOCAINE HYDROCHLORIDE 10 ML: 10 INJECTION, SOLUTION EPIDURAL; INFILTRATION; INTRACAUDAL; PERINEURAL at 10:50

## 2024-04-22 RX ADMIN — SODIUM CHLORIDE 3000 MG: 9 INJECTION, SOLUTION INTRAVENOUS at 10:40

## 2024-04-22 RX ADMIN — BUPIVACAINE HYDROCHLORIDE 10 ML: 5 INJECTION, SOLUTION EPIDURAL; INTRACAUDAL; PERINEURAL at 11:03

## 2024-04-22 RX ADMIN — MIDAZOLAM 2 MG: 1 INJECTION INTRAMUSCULAR; INTRAVENOUS at 10:30

## 2024-04-22 ASSESSMENT — PAIN DESCRIPTION - DESCRIPTORS: DESCRIPTORS: ACHING;SPASM;SHOOTING;SHARP

## 2024-04-22 ASSESSMENT — PAIN - FUNCTIONAL ASSESSMENT: PAIN_FUNCTIONAL_ASSESSMENT: 0-10

## 2024-04-22 ASSESSMENT — PAIN SCALES - GENERAL: PAINLEVEL_OUTOF10: 0

## 2024-04-22 NOTE — H&P
Radiology History and Physical    Patient: Chet Jacobs 79 y.o. male       Chief Complaint: No chief complaint on file.      History of Present Illness: 79 year old male with L2 compression fracture, presents today for kyphoplasty. Seen in clinic last week, no changes to history.    History:    Past Medical History:   Diagnosis Date    Borderline diabetes     BPH (benign prostatic hypertrophy)     CAD (coronary artery disease) Feb. 2013    h/o CABG    High cholesterol     Hypertension      No family history on file.  Social History     Socioeconomic History    Marital status:      Spouse name: Not on file    Number of children: Not on file    Years of education: Not on file    Highest education level: Not on file   Occupational History    Not on file   Tobacco Use    Smoking status: Former    Smokeless tobacco: Never   Substance and Sexual Activity    Alcohol use: Yes    Drug use: Not on file    Sexual activity: Not on file   Other Topics Concern    Not on file   Social History Narrative    Not on file     Social Determinants of Health     Financial Resource Strain: Not on file   Food Insecurity: Not on file   Transportation Needs: Not on file   Physical Activity: Not on file   Stress: Not on file   Social Connections: Not on file   Intimate Partner Violence: Not on file   Housing Stability: Not on file       Allergies:   Allergies   Allergen Reactions    Oxycodone Itching       Current Medications:  No current facility-administered medications for this encounter.        Physical Exam:  Blood pressure 130/80, pulse 92, temperature 97.9 °F (36.6 °C), temperature source Oral, resp. rate 18, height 1.803 m (5' 11\"), weight 133.8 kg (295 lb), SpO2 94 %.  GENERAL: alert, cooperative, no distress, appears stated age,   LUNG: Nonlabored respiration on room air  HEART: regular rate and rhythm    ASA 3  Mallampati 3    Alerts:      Laboratory:    No results for input(s): \"HGB\", \"HCT\", \"WBC\", \"PLT\", \"PTT\", \"INR\",

## 2024-04-22 NOTE — ANESTHESIA PRE PROCEDURE
Department of Anesthesiology  Preprocedure Note       Name:  Chet Jacobs   Age:  79 y.o.  :  1945                                          MRN:  [id number]         Date:  2024      Surgeon: * No surgeons listed *    Procedure: * No procedures listed *    Medications prior to admission:   Prior to Admission medications    Medication Sig Start Date End Date Taking? Authorizing Provider   ELIQUIS 5 MG TABS tablet Take 1 tablet by mouth 2 times daily 23  Yes Provider, MD Almaz   spironolactone (ALDACTONE) 25 MG tablet  23  Yes Provider, MD Almaz   HYDROcodone-acetaminophen (NORCO) 5-325 MG per tablet 1/2 tab every 8 hours as needed for pain 24  Yes Provider, MD Almaz   methocarbamol (ROBAXIN) 500 MG tablet TAKE 1 TABLET BY MOUTH 3 TIMES A DAY AS NEEDED FOR MUSCLE SPASMS.    Provider, MD Almaz   albuterol sulfate HFA (PROVENTIL;VENTOLIN;PROAIR) 108 (90 Base) MCG/ACT inhaler Inhale 2 puffs into the lungs every 4 hours as needed 5/3/18   Automatic Reconciliation, Ar   albuterol (PROVENTIL) (5 MG/ML) 0.5% nebulizer solution Inhale into the lungs    Automatic Reconciliation, Ar   amLODIPine (NORVASC) 5 MG tablet Take 1 tablet by mouth daily 19   Automatic Reconciliation, Ar   aspirin 81 MG EC tablet Take 2 tablets by mouth daily    Automatic Reconciliation, Ar   Aspirin-Calcium Carbonate  MG TABS Take 162 mg by mouth daily    Automatic Reconciliation, Ar   ergocalciferol (ERGOCALCIFEROL) 1.25 MG (16697 UT) capsule ceived the following from [facility]: Outside name: ergocalciferol (ERGOCALCIFEROL) 1,250 mcg (50,000 unit) capsule  Patient not taking: Reported on 2024 3/21/20   Automatic Reconciliation, Ar   fenofibrate (TRICOR) 145 MG tablet Take 1 tablet by mouth daily    Automatic Reconciliation, Ar   fluticasone furoate-vilanterol (BREO ELLIPTA) 200-25 MCG/ACT AEPB inhaler ceived the following from [facility]: Outside name:

## 2024-04-22 NOTE — PROGRESS NOTES
Pt arrives ambulatory to angio department accompanied by wife for L2 Kypho procedure. All assessments completed and consent was reviewed.  Education given was regarding procedure, anesthesia, post-procedure care and  management/follow-up. Opportunity for questions was provided and all questions and concerns were addressed.

## 2024-04-22 NOTE — PROCEDURES
Interventional Radiology  Procedure Note        4/22/2024 1:38 PM    Patient: Chet Jacobs     Informed consent obtained    Diagnosis: L2 compression fracture    Procedure(s): L2 kyphoplasty    Estimated blood loss: less than 5cc    Anesthesia: sedation by anesthesia team    Specimens removed:  none    Complications: None    Implants: None    Primary Physician: Vinayak Valdovinos MD    Recommendations: N/A    Full dictated report to follow    Vinayak Valdovinos MD  Interventional Radiology  [facility]  1:38 PM, 4/22/2024

## 2024-04-22 NOTE — ANESTHESIA POSTPROCEDURE EVALUATION
Department of Anesthesiology  Postprocedure Note    Patient: Chet Jacobs  MRN: [id number]  YOB: 1945  Date of evaluation: 4/22/2024    Procedure Summary       Date: 04/22/24 Room / Location: [facility] RADIOLOGY; [facility] ANGIO IR    Anesthesia Start: 1024 Anesthesia Stop: 1111    Procedure: IR KYPHOPLASTY LUMBAR FIRST LEVEL Diagnosis:       Age-related osteoporosis with current pathological fracture of vertebra, initial encounter (HCC)      Age-related osteoporosis with current pathological fracture of vertebra, initial encounter ([facility])      (L2 Kypho)    Scheduled Providers: Radiologist, [facility]; Luis Alberto Longo MD Responsible Provider: Abran Benavides Jr., MD    Anesthesia Type: MAC ASA Status: 3            Anesthesia Type: MAC    Bennett Phase I: Bennett Score: 10    Bennett Phase II:      Anesthesia Post Evaluation    Patient location during evaluation: PACU  Patient participation: complete - patient participated  Level of consciousness: awake  Pain score: 2  Airway patency: patent  Nausea & Vomiting: no nausea  Cardiovascular status: blood pressure returned to baseline and hemodynamically stable  Respiratory status: acceptable  Hydration status: stable  Multimodal analgesia pain management approach    No notable events documented.

## 2024-05-22 ENCOUNTER — HOSPITAL ENCOUNTER (OUTPATIENT)
Facility: HOSPITAL | Age: 79
Discharge: HOME | End: 2024-05-25
Payer: MEDICARE

## 2024-05-22 VITALS
OXYGEN SATURATION: 95 % | SYSTOLIC BLOOD PRESSURE: 140 MMHG | DIASTOLIC BLOOD PRESSURE: 90 MMHG | HEIGHT: 71 IN | WEIGHT: 295 LBS | TEMPERATURE: 98 F | RESPIRATION RATE: 18 BRPM | BODY MASS INDEX: 41.3 KG/M2 | HEART RATE: 75 BPM

## 2024-05-22 DIAGNOSIS — M81.0 AGE-RELATED OSTEOPOROSIS WITHOUT CURRENT PATHOLOGICAL FRACTURE: ICD-10-CM

## 2024-05-22 PROCEDURE — 2500000003 HC RX 250 WO HCPCS: Performed by: STUDENT IN AN ORGANIZED HEALTH CARE EDUCATION/TRAINING PROGRAM

## 2024-05-22 PROCEDURE — 6360000004 HC RX CONTRAST MEDICATION: Performed by: STUDENT IN AN ORGANIZED HEALTH CARE EDUCATION/TRAINING PROGRAM

## 2024-05-22 PROCEDURE — 6360000002 HC RX W HCPCS: Performed by: STUDENT IN AN ORGANIZED HEALTH CARE EDUCATION/TRAINING PROGRAM

## 2024-05-22 PROCEDURE — 64484 NJX AA&/STRD TFRM EPI L/S EA: CPT

## 2024-05-22 RX ORDER — DEXAMETHASONE SODIUM PHOSPHATE 10 MG/ML
15 INJECTION, SOLUTION INTRAMUSCULAR; INTRAVENOUS ONCE
Status: COMPLETED | OUTPATIENT
Start: 2024-05-22 | End: 2024-05-22

## 2024-05-22 RX ORDER — IOPAMIDOL 612 MG/ML
15 INJECTION, SOLUTION INTRATHECAL ONCE
Status: COMPLETED | OUTPATIENT
Start: 2024-05-22 | End: 2024-05-22

## 2024-05-22 RX ORDER — LIDOCAINE HYDROCHLORIDE 20 MG/ML
20 INJECTION, SOLUTION INFILTRATION; PERINEURAL ONCE
Status: COMPLETED | OUTPATIENT
Start: 2024-05-22 | End: 2024-05-22

## 2024-05-22 RX ORDER — LIDOCAINE HYDROCHLORIDE 10 MG/ML
4 INJECTION, SOLUTION EPIDURAL; INFILTRATION; INTRACAUDAL; PERINEURAL ONCE
Status: COMPLETED | OUTPATIENT
Start: 2024-05-22 | End: 2024-05-22

## 2024-05-22 RX ADMIN — LIDOCAINE HYDROCHLORIDE 4 ML: 10 INJECTION, SOLUTION EPIDURAL; INFILTRATION; INTRACAUDAL; PERINEURAL at 08:27

## 2024-05-22 RX ADMIN — DEXAMETHASONE SODIUM PHOSPHATE 15 MG: 10 INJECTION, SOLUTION INTRAMUSCULAR; INTRAVENOUS at 08:27

## 2024-05-22 RX ADMIN — LIDOCAINE HYDROCHLORIDE 8 ML: 20 INJECTION, SOLUTION INFILTRATION; PERINEURAL at 08:26

## 2024-05-22 RX ADMIN — IOPAMIDOL 2 ML: 612 INJECTION, SOLUTION INTRATHECAL at 08:27

## 2024-05-22 ASSESSMENT — PAIN - FUNCTIONAL ASSESSMENT: PAIN_FUNCTIONAL_ASSESSMENT: NONE - DENIES PAIN

## 2024-05-22 NOTE — DISCHARGE INSTRUCTIONS
[facility]  Special Procedures/Radiology Department      Steroidal Injection      Go home and rest.     No vigorous physical activity today.    Be aware that numbness and/or tingling can occur up to 24 hours after the injection.    No driving today.    Resume your previous diet.    Resume your previous medications.     Depending on your job, you may return to work in 24 to 48 hours.     It may take up to one week after the injection to see a change or an improvement in your symptoms.    Be sure to follow up with your physician.  Tell your physician if the injection helped with your symptoms or if the injection did nothing for your symptoms.    For minor discomfort, you can take Tylenol, as directed on the label.      If you have any questions or concerns, please call [phone] and ask for the nurse on-call

## 2024-05-22 NOTE — PROGRESS NOTES
Patient arrived to IR ambulatory AxO4. VSS and in no apparent distress at this time.   Informed consent obtained by Vinayak Valdovinos. Patient given the opportunity to ask questions and all concerns were addressed at this time.     Patient states there is no history of MRSA,C.Diff,VRE, and TB    0831-  LO completed without complication. Patient in NAD.    0845- Discharge instructions reviewed with patient, patient verbalizes understanding of education provided.  Opportunities given for questions and none at this time. Copy of AVS given to patient with radiology phone number included.  Patient is in NAD, on RA, and has no complaints of pain at this time. Patient exhibiting baseline gait.  Discharged from X-ray recovery via wheelchair in stable condition. Pt released with family member via wheelchair.

## 2024-05-22 NOTE — PROCEDURES
Interventional Radiology  Procedure Note        5/22/2024 9:41 AM    Patient: Chet Jacobs     Informed consent obtained    Diagnosis: Back pain, hip pain    Procedure(s): Right L2-3, L3-4 transforaminal LO    Estimated blood loss: less than 5cc    Anesthesia: local    Specimens removed:  none    Complications: None    Implants: None    Primary Physician: Vinayak Valdovinos MD    Recommendations: N/A    Full dictated report to follow    Vinayak Valdovinos MD  Interventional Radiology  [facility]  9:41 AM, 5/22/2024

## 2024-06-14 ENCOUNTER — HOSPITAL ENCOUNTER (OUTPATIENT)
Facility: HOSPITAL | Age: 79
End: 2024-06-14
Attending: INTERNAL MEDICINE
Payer: MEDICARE

## 2024-06-14 VITALS
OXYGEN SATURATION: 93 % | WEIGHT: 295 LBS | DIASTOLIC BLOOD PRESSURE: 79 MMHG | HEART RATE: 59 BPM | BODY MASS INDEX: 41.14 KG/M2 | SYSTOLIC BLOOD PRESSURE: 110 MMHG | RESPIRATION RATE: 17 BRPM

## 2024-06-14 DIAGNOSIS — I82.90 THROMBUS: ICD-10-CM

## 2024-06-14 PROCEDURE — 93312 ECHO TRANSESOPHAGEAL: CPT

## 2024-06-14 PROCEDURE — 6370000000 HC RX 637 (ALT 250 FOR IP): Performed by: INTERNAL MEDICINE

## 2024-06-14 PROCEDURE — 6360000002 HC RX W HCPCS: Performed by: INTERNAL MEDICINE

## 2024-06-14 RX ORDER — MIDAZOLAM HYDROCHLORIDE 1 MG/ML
INJECTION INTRAMUSCULAR; INTRAVENOUS PRN
Status: COMPLETED | OUTPATIENT
Start: 2024-06-14 | End: 2024-06-14

## 2024-06-14 RX ORDER — FENTANYL CITRATE 50 UG/ML
INJECTION, SOLUTION INTRAMUSCULAR; INTRAVENOUS PRN
Status: COMPLETED | OUTPATIENT
Start: 2024-06-14 | End: 2024-06-14

## 2024-06-14 RX ORDER — LIDOCAINE HYDROCHLORIDE 20 MG/ML
SOLUTION OROPHARYNGEAL PRN
Status: COMPLETED | OUTPATIENT
Start: 2024-06-14 | End: 2024-06-14

## 2024-06-14 RX ORDER — BUMETANIDE 2 MG/1
2 TABLET ORAL 2 TIMES DAILY
COMMUNITY

## 2024-06-14 RX ADMIN — LIDOCAINE HYDROCHLORIDE 10 ML: 20 SOLUTION ORAL at 07:56

## 2024-06-14 RX ADMIN — MIDAZOLAM HYDROCHLORIDE 1 MG: 1 INJECTION, SOLUTION INTRAMUSCULAR; INTRAVENOUS at 08:10

## 2024-06-14 RX ADMIN — FENTANYL CITRATE 50 MCG: 50 INJECTION, SOLUTION INTRAMUSCULAR; INTRAVENOUS at 08:08

## 2024-06-14 RX ADMIN — BENZOCAINE, BUTAMBEN, AND TETRACAINE HYDROCHLORIDE 2 SPRAY: .028; .004; .004 AEROSOL, SPRAY TOPICAL at 07:56

## 2024-06-14 RX ADMIN — MIDAZOLAM HYDROCHLORIDE 2 MG: 1 INJECTION, SOLUTION INTRAMUSCULAR; INTRAVENOUS at 08:08

## 2024-06-14 NOTE — PROGRESS NOTES
Patient arrived to Non-Invasive Cardiology Lab for Out Patient ALLY Procedure. Staff introduced to patient. Patient identifiers verified with Name and Date of Birth. Procedure verified with patient. Consent forms reviewed and signed by patient or authorized representative and verified. Allergies verified. Patient informed of procedure and plan of care. Questions answered with review.     Patient on cardiac monitor, non-invasive blood pressure, SPO2 monitor. On room air. Patient is A&Ox3. Patient reports no complaints.    Patient on stretcher, in low position, with side rails up. Patient instructed to call for assistance as needed.    Family in waiting room.

## 2024-06-14 NOTE — PROGRESS NOTES
Pt sedated with 3 mg Versed and 50 mcg Fentanyl for ALLY (monitored sedation from 0807 to 0820)

## 2024-06-14 NOTE — DISCHARGE INSTRUCTIONS
DISCHARGE SUMMARY       PATIENT INSTRUCTIONS: Continue taking all the same medications.      You had a transesophageal echocardiogram, your throat was numbed for the procedure, so start with sips of water and advance diet as swallowing returns to normal. Avoid any scalding hot beverages for the next 2 hours. (1000)    Call to make an appointment with Dr. Mauro for follow up.   What to do at Home:  Recommended activity: No driving today      The discharge information has been reviewed with the PATIENT .  The PATIENT  verbalized understanding.

## 2024-06-14 NOTE — PROGRESS NOTES
I have reviewed discharge instructions with the patient.  The patient verbalized understanding.    Discharge medications reviewed with patient and appropriate educational materials regarding medications and side effects teaching were provided.    Site care instructions reviewed with patient. Pain management teaching completed.    Patient instructed to make follow up appointments per discharge instructions.     Patient belongings packed up and accounted for with patient and family. All patient belongings sent home with patient.    Telemetry monitor and wires removed      Patient signed discharge instructions after reviewing them, and duplicate copy placed in chart.

## 2024-07-12 ENCOUNTER — HOSPITAL ENCOUNTER (OUTPATIENT)
Facility: HOSPITAL | Age: 79
End: 2024-07-12
Payer: MEDICARE

## 2024-07-12 VITALS
WEIGHT: 297.4 LBS | DIASTOLIC BLOOD PRESSURE: 76 MMHG | BODY MASS INDEX: 44.05 KG/M2 | RESPIRATION RATE: 2 BRPM | OXYGEN SATURATION: 96 % | HEART RATE: 60 BPM | TEMPERATURE: 97.9 F | SYSTOLIC BLOOD PRESSURE: 131 MMHG | HEIGHT: 69 IN

## 2024-07-12 LAB
ABO + RH BLD: NORMAL
ANION GAP SERPL CALC-SCNC: 5 MMOL/L (ref 5–15)
APPEARANCE UR: CLEAR
BACTERIA URNS QL MICRO: NEGATIVE /HPF
BILIRUB UR QL: NEGATIVE
BLOOD GROUP ANTIBODIES SERPL: NORMAL
BUN SERPL-MCNC: 16 MG/DL (ref 6–20)
BUN/CREAT SERPL: 11 (ref 12–20)
CALCIUM SERPL-MCNC: 11.3 MG/DL (ref 8.5–10.1)
CHLORIDE SERPL-SCNC: 103 MMOL/L (ref 97–108)
CO2 SERPL-SCNC: 30 MMOL/L (ref 21–32)
COLOR UR: NORMAL
CREAT SERPL-MCNC: 1.43 MG/DL (ref 0.7–1.3)
EKG DIAGNOSIS: NORMAL
EKG Q-T INTERVAL: 424 MS
EKG QRS DURATION: 90 MS
EKG QTC CALCULATION (BAZETT): 444 MS
EKG R AXIS: 1 DEGREES
EKG T AXIS: 20 DEGREES
EKG VENTRICULAR RATE: 66 BPM
EPITH CASTS URNS QL MICRO: NORMAL /LPF
ERYTHROCYTE [DISTWIDTH] IN BLOOD BY AUTOMATED COUNT: 13.1 % (ref 11.5–14.5)
GLUCOSE SERPL-MCNC: 112 MG/DL (ref 65–100)
GLUCOSE UR STRIP.AUTO-MCNC: NEGATIVE MG/DL
HCT VFR BLD AUTO: 43.8 % (ref 36.6–50.3)
HGB BLD-MCNC: 14.4 G/DL (ref 12.1–17)
HGB UR QL STRIP: NEGATIVE
HISTORY CHECK: NORMAL
HYALINE CASTS URNS QL MICRO: NORMAL /LPF (ref 0–2)
KETONES UR QL STRIP.AUTO: NEGATIVE MG/DL
LEUKOCYTE ESTERASE UR QL STRIP.AUTO: NEGATIVE
MAGNESIUM SERPL-MCNC: 2.1 MG/DL (ref 1.6–2.4)
MCH RBC QN AUTO: 30.6 PG (ref 26–34)
MCHC RBC AUTO-ENTMCNC: 32.9 G/DL (ref 30–36.5)
MCV RBC AUTO: 93.2 FL (ref 80–99)
NITRITE UR QL STRIP.AUTO: NEGATIVE
NRBC # BLD: 0 K/UL (ref 0–0.01)
NRBC BLD-RTO: 0 PER 100 WBC
PH UR STRIP: 7 (ref 5–8)
PLATELET # BLD AUTO: 212 K/UL (ref 150–400)
PMV BLD AUTO: 10.6 FL (ref 8.9–12.9)
POTASSIUM SERPL-SCNC: 3.8 MMOL/L (ref 3.5–5.1)
PROT UR STRIP-MCNC: NEGATIVE MG/DL
RBC # BLD AUTO: 4.7 M/UL (ref 4.1–5.7)
RBC #/AREA URNS HPF: NORMAL /HPF (ref 0–5)
SODIUM SERPL-SCNC: 138 MMOL/L (ref 136–145)
SP GR UR REFRACTOMETRY: 1.01
SPECIMEN EXP DATE BLD: NORMAL
URINE CULTURE IF INDICATED: NORMAL
UROBILINOGEN UR QL STRIP.AUTO: 0.2 EU/DL (ref 0.2–1)
WBC # BLD AUTO: 6.7 K/UL (ref 4.1–11.1)
WBC URNS QL MICRO: NORMAL /HPF (ref 0–4)

## 2024-07-12 PROCEDURE — 71046 X-RAY EXAM CHEST 2 VIEWS: CPT

## 2024-07-12 PROCEDURE — 80048 BASIC METABOLIC PNL TOTAL CA: CPT

## 2024-07-12 PROCEDURE — 83735 ASSAY OF MAGNESIUM: CPT

## 2024-07-12 PROCEDURE — 86850 RBC ANTIBODY SCREEN: CPT

## 2024-07-12 PROCEDURE — 93005 ELECTROCARDIOGRAM TRACING: CPT

## 2024-07-12 PROCEDURE — 86900 BLOOD TYPING SEROLOGIC ABO: CPT

## 2024-07-12 PROCEDURE — 36415 COLL VENOUS BLD VENIPUNCTURE: CPT

## 2024-07-12 PROCEDURE — 86901 BLOOD TYPING SEROLOGIC RH(D): CPT

## 2024-07-12 PROCEDURE — 85027 COMPLETE CBC AUTOMATED: CPT

## 2024-07-12 PROCEDURE — 81001 URINALYSIS AUTO W/SCOPE: CPT

## 2024-07-12 RX ORDER — SENNOSIDES 8.6 MG
2 CAPSULE ORAL DAILY
COMMUNITY

## 2024-07-12 RX ORDER — ATORVASTATIN CALCIUM 40 MG/1
40 TABLET, FILM COATED ORAL DAILY
COMMUNITY

## 2024-07-12 RX ORDER — FLUTICASONE FUROATE AND VILANTEROL 200; 25 UG/1; UG/1
1 POWDER RESPIRATORY (INHALATION) DAILY
COMMUNITY

## 2024-07-12 ASSESSMENT — PAIN SCALES - GENERAL: PAINLEVEL_OUTOF10: 9

## 2024-07-12 ASSESSMENT — PAIN DESCRIPTION - LOCATION: LOCATION: HIP

## 2024-07-12 ASSESSMENT — PAIN DESCRIPTION - DESCRIPTORS: DESCRIPTORS: ACHING

## 2024-07-12 ASSESSMENT — PAIN DESCRIPTION - ORIENTATION: ORIENTATION: LEFT

## 2024-07-20 ENCOUNTER — ANESTHESIA EVENT (OUTPATIENT)
Facility: HOSPITAL | Age: 79
DRG: 274 | End: 2024-07-20
Payer: MEDICARE

## 2024-07-21 LAB
ABO + RH BLD: NORMAL
BLD PROD TYP BPU: NORMAL
BLD PROD TYP BPU: NORMAL
BLOOD BANK DISPENSE STATUS: NORMAL
BLOOD BANK DISPENSE STATUS: NORMAL
BLOOD GROUP ANTIBODIES SERPL: NORMAL
BPU ID: NORMAL
BPU ID: NORMAL
CROSSMATCH RESULT: NORMAL
CROSSMATCH RESULT: NORMAL
SPECIMEN EXP DATE BLD: NORMAL
UNIT DIVISION: 0
UNIT DIVISION: 0

## 2024-07-22 ENCOUNTER — HOSPITAL ENCOUNTER (INPATIENT)
Facility: HOSPITAL | Age: 79
LOS: 1 days | Discharge: HOME | DRG: 274 | End: 2024-07-22
Attending: INTERNAL MEDICINE | Admitting: INTERNAL MEDICINE
Payer: MEDICARE

## 2024-07-22 ENCOUNTER — ANESTHESIA (OUTPATIENT)
Facility: HOSPITAL | Age: 79
DRG: 274 | End: 2024-07-22
Payer: MEDICARE

## 2024-07-22 VITALS
BODY MASS INDEX: 42.37 KG/M2 | SYSTOLIC BLOOD PRESSURE: 154 MMHG | OXYGEN SATURATION: 93 % | HEIGHT: 70 IN | RESPIRATION RATE: 18 BRPM | DIASTOLIC BLOOD PRESSURE: 69 MMHG | WEIGHT: 296 LBS | TEMPERATURE: 97.9 F | HEART RATE: 61 BPM

## 2024-07-22 DIAGNOSIS — I48.91 A-FIB (HCC): ICD-10-CM

## 2024-07-22 DIAGNOSIS — I48.19 PERSISTENT ATRIAL FIBRILLATION (HCC): Primary | ICD-10-CM

## 2024-07-22 PROBLEM — I48.0 PAF (PAROXYSMAL ATRIAL FIBRILLATION) (HCC): Status: ACTIVE | Noted: 2024-07-22

## 2024-07-22 LAB
ECHO BSA: 2.57 M2
GLUCOSE BLD STRIP.AUTO-MCNC: 112 MG/DL (ref 65–117)
GLUCOSE BLD STRIP.AUTO-MCNC: 118 MG/DL (ref 65–117)
SERVICE CMNT-IMP: ABNORMAL
SERVICE CMNT-IMP: NORMAL

## 2024-07-22 PROCEDURE — 02L73DK OCCLUSION OF LEFT ATRIAL APPENDAGE WITH INTRALUMINAL DEVICE, PERCUTANEOUS APPROACH: ICD-10-PCS | Performed by: INTERNAL MEDICINE

## 2024-07-22 PROCEDURE — 2709999900 HC NON-CHARGEABLE SUPPLY: Performed by: INTERNAL MEDICINE

## 2024-07-22 PROCEDURE — 2580000003 HC RX 258: Performed by: NURSE ANESTHETIST, CERTIFIED REGISTERED

## 2024-07-22 PROCEDURE — 7100000000 HC PACU RECOVERY - FIRST 15 MIN: Performed by: INTERNAL MEDICINE

## 2024-07-22 PROCEDURE — 7100000001 HC PACU RECOVERY - ADDTL 15 MIN: Performed by: INTERNAL MEDICINE

## 2024-07-22 PROCEDURE — 2500000003 HC RX 250 WO HCPCS: Performed by: NURSE ANESTHETIST, CERTIFIED REGISTERED

## 2024-07-22 PROCEDURE — C1769 GUIDE WIRE: HCPCS | Performed by: INTERNAL MEDICINE

## 2024-07-22 PROCEDURE — 3700000000 HC ANESTHESIA ATTENDED CARE: Performed by: INTERNAL MEDICINE

## 2024-07-22 PROCEDURE — 82962 GLUCOSE BLOOD TEST: CPT

## 2024-07-22 PROCEDURE — C1889 IMPLANT/INSERT DEVICE, NOC: HCPCS | Performed by: INTERNAL MEDICINE

## 2024-07-22 PROCEDURE — 33340 PERQ CLSR TCAT L ATR APNDGE: CPT | Performed by: INTERNAL MEDICINE

## 2024-07-22 PROCEDURE — 6360000004 HC RX CONTRAST MEDICATION: Performed by: INTERNAL MEDICINE

## 2024-07-22 PROCEDURE — 2060000000 HC ICU INTERMEDIATE R&B

## 2024-07-22 PROCEDURE — C1894 INTRO/SHEATH, NON-LASER: HCPCS | Performed by: INTERNAL MEDICINE

## 2024-07-22 PROCEDURE — 3700000001 HC ADD 15 MINUTES (ANESTHESIA): Performed by: INTERNAL MEDICINE

## 2024-07-22 PROCEDURE — 6360000002 HC RX W HCPCS: Performed by: NURSE ANESTHETIST, CERTIFIED REGISTERED

## 2024-07-22 PROCEDURE — B24BZZ4 ULTRASONOGRAPHY OF HEART WITH AORTA, TRANSESOPHAGEAL: ICD-10-PCS | Performed by: INTERNAL MEDICINE

## 2024-07-22 DEVICE — LEFT ATRIAL APPENDAGE CLOSURE DEVICE WITH DELIVERY SYSTEM
Type: IMPLANTABLE DEVICE | Status: FUNCTIONAL
Brand: WATCHMAN FLX™ PRO

## 2024-07-22 RX ORDER — ROCURONIUM BROMIDE 10 MG/ML
INJECTION, SOLUTION INTRAVENOUS PRN
Status: DISCONTINUED | OUTPATIENT
Start: 2024-07-22 | End: 2024-07-22 | Stop reason: SDUPTHER

## 2024-07-22 RX ORDER — FENTANYL CITRATE 50 UG/ML
25 INJECTION, SOLUTION INTRAMUSCULAR; INTRAVENOUS EVERY 5 MIN PRN
OUTPATIENT
Start: 2024-07-22

## 2024-07-22 RX ORDER — CLOPIDOGREL BISULFATE 75 MG/1
75 TABLET ORAL DAILY
Qty: 30 TABLET | Refills: 5 | Status: SHIPPED | OUTPATIENT
Start: 2024-07-22

## 2024-07-22 RX ORDER — SODIUM CHLORIDE 0.9 % (FLUSH) 0.9 %
5-40 SYRINGE (ML) INJECTION PRN
OUTPATIENT
Start: 2024-07-22

## 2024-07-22 RX ORDER — SODIUM CHLORIDE 9 MG/ML
INJECTION, SOLUTION INTRAVENOUS CONTINUOUS PRN
Status: DISCONTINUED | OUTPATIENT
Start: 2024-07-22 | End: 2024-07-22 | Stop reason: SDUPTHER

## 2024-07-22 RX ORDER — SODIUM CHLORIDE 9 MG/ML
INJECTION, SOLUTION INTRAVENOUS PRN
OUTPATIENT
Start: 2024-07-22

## 2024-07-22 RX ORDER — PROTAMINE SULFATE 10 MG/ML
INJECTION, SOLUTION INTRAVENOUS PRN
Status: DISCONTINUED | OUTPATIENT
Start: 2024-07-22 | End: 2024-07-22 | Stop reason: SDUPTHER

## 2024-07-22 RX ORDER — HEPARIN SODIUM 1000 [USP'U]/ML
INJECTION, SOLUTION INTRAVENOUS; SUBCUTANEOUS PRN
Status: DISCONTINUED | OUTPATIENT
Start: 2024-07-22 | End: 2024-07-22 | Stop reason: SDUPTHER

## 2024-07-22 RX ORDER — ONDANSETRON 2 MG/ML
INJECTION INTRAMUSCULAR; INTRAVENOUS PRN
Status: DISCONTINUED | OUTPATIENT
Start: 2024-07-22 | End: 2024-07-22 | Stop reason: SDUPTHER

## 2024-07-22 RX ORDER — SODIUM CHLORIDE 0.9 % (FLUSH) 0.9 %
5-40 SYRINGE (ML) INJECTION EVERY 12 HOURS SCHEDULED
OUTPATIENT
Start: 2024-07-22

## 2024-07-22 RX ORDER — HYDROMORPHONE HYDROCHLORIDE 1 MG/ML
0.5 INJECTION, SOLUTION INTRAMUSCULAR; INTRAVENOUS; SUBCUTANEOUS EVERY 5 MIN PRN
OUTPATIENT
Start: 2024-07-22

## 2024-07-22 RX ORDER — FENTANYL CITRATE 50 UG/ML
INJECTION, SOLUTION INTRAMUSCULAR; INTRAVENOUS PRN
Status: DISCONTINUED | OUTPATIENT
Start: 2024-07-22 | End: 2024-07-22 | Stop reason: SDUPTHER

## 2024-07-22 RX ORDER — HYDRALAZINE HYDROCHLORIDE 20 MG/ML
10 INJECTION INTRAMUSCULAR; INTRAVENOUS ONCE
OUTPATIENT
Start: 2024-07-22 | End: 2024-07-22

## 2024-07-22 RX ORDER — DEXAMETHASONE SODIUM PHOSPHATE 4 MG/ML
INJECTION, SOLUTION INTRA-ARTICULAR; INTRALESIONAL; INTRAMUSCULAR; INTRAVENOUS; SOFT TISSUE PRN
Status: DISCONTINUED | OUTPATIENT
Start: 2024-07-22 | End: 2024-07-22 | Stop reason: SDUPTHER

## 2024-07-22 RX ORDER — ONDANSETRON 2 MG/ML
4 INJECTION INTRAMUSCULAR; INTRAVENOUS
OUTPATIENT
Start: 2024-07-22 | End: 2024-07-23

## 2024-07-22 RX ORDER — PHENYLEPHRINE HCL IN 0.9% NACL 0.4MG/10ML
SYRINGE (ML) INTRAVENOUS PRN
Status: DISCONTINUED | OUTPATIENT
Start: 2024-07-22 | End: 2024-07-22 | Stop reason: SDUPTHER

## 2024-07-22 RX ORDER — NALOXONE HYDROCHLORIDE 0.4 MG/ML
INJECTION, SOLUTION INTRAMUSCULAR; INTRAVENOUS; SUBCUTANEOUS PRN
OUTPATIENT
Start: 2024-07-22

## 2024-07-22 RX ADMIN — Medication 80 MCG: at 13:51

## 2024-07-22 RX ADMIN — PHENYLEPHRINE HYDROCHLORIDE 25 MCG/MIN: 10 INJECTION INTRAVENOUS at 14:30

## 2024-07-22 RX ADMIN — ONDANSETRON HYDROCHLORIDE 4 MG: 2 INJECTION, SOLUTION INTRAMUSCULAR; INTRAVENOUS at 14:40

## 2024-07-22 RX ADMIN — ROCURONIUM BROMIDE 50 MG: 10 INJECTION INTRAVENOUS at 13:47

## 2024-07-22 RX ADMIN — SUGAMMADEX 200 MG: 100 INJECTION, SOLUTION INTRAVENOUS at 14:41

## 2024-07-22 RX ADMIN — Medication 120 MCG: at 13:57

## 2024-07-22 RX ADMIN — FENTANYL CITRATE 50 MCG: 50 INJECTION, SOLUTION INTRAMUSCULAR; INTRAVENOUS at 13:45

## 2024-07-22 RX ADMIN — Medication 80 MCG: at 14:31

## 2024-07-22 RX ADMIN — PROPOFOL 200 MG: 10 INJECTION, EMULSION INTRAVENOUS at 13:47

## 2024-07-22 RX ADMIN — Medication 80 MCG: at 14:25

## 2024-07-22 RX ADMIN — PROTAMINE SULFATE 80 MG: 10 INJECTION, SOLUTION INTRAVENOUS at 14:40

## 2024-07-22 RX ADMIN — LIDOCAINE HYDROCHLORIDE 100 MG: 20 INJECTION, SOLUTION EPIDURAL; INFILTRATION; INTRACAUDAL; PERINEURAL at 13:46

## 2024-07-22 RX ADMIN — HEPARIN SODIUM 12000 UNITS: 1000 INJECTION, SOLUTION INTRAVENOUS; SUBCUTANEOUS at 14:20

## 2024-07-22 RX ADMIN — DEXAMETHASONE SODIUM PHOSPHATE 4 MG: 4 INJECTION, SOLUTION INTRAMUSCULAR; INTRAVENOUS at 13:57

## 2024-07-22 RX ADMIN — FENTANYL CITRATE 50 MCG: 50 INJECTION, SOLUTION INTRAMUSCULAR; INTRAVENOUS at 14:41

## 2024-07-22 RX ADMIN — HEPARIN SODIUM 2000 UNITS: 1000 INJECTION, SOLUTION INTRAVENOUS; SUBCUTANEOUS at 14:23

## 2024-07-22 RX ADMIN — SODIUM CHLORIDE: 9 INJECTION, SOLUTION INTRAVENOUS at 13:31

## 2024-07-22 NOTE — ANESTHESIA PRE PROCEDURE
regular  Rate: normal  Echocardiogram reviewed         Beta Blocker:  Dose within 24 Hrs         Neuro/Psych:   Negative Neuro/Psych ROS              GI/Hepatic/Renal: Neg GI/Hepatic/Renal ROS            Endo/Other: Negative Endo/Other ROS   (+) blood dyscrasia: anticoagulation therapy:..                 Abdominal: normal exam            Vascular: negative vascular ROS.         Other Findings:           Anesthesia Plan      general     ASA 3       Induction: intravenous.  ALLY  MIPS: Prophylactic antiemetics administered.  Anesthetic plan and risks discussed with patient.    Use of blood products discussed with patient whom consented to blood products.    Plan discussed with CRNA and surgical team.                  Orly Marie DO   7/22/2024

## 2024-07-22 NOTE — DISCHARGE SUMMARY
This 79 year old patient presents for AFib.    HISTORY OF PRESENT ILLNESS:   Mr. Jacobs is a very pleasant 79-year-old with past medical history of coronary artery disease status post CABG, persistent atrial fibrillation status post cardioversions in the past and diabetes who was referred to me by Dr. Domínguez for evaluation and management of atrial fibrillation.  He just moved here from [place] and establish care with Dr. Domínguez. He recently had a Lexiscan that showed possible ischemia in circumflex artery distribution.  Dr. Domínguez is working that up further.  He also has a history of atrial fibrillation that started following his CABG.  He has been cardioverted before and now is in persistent atrial fibrillation.  He is having progressive shortness of breath which he has been attributing to his COPD.  However Dr. Domínguez thought that there may be a component of atrial fibrillation playing a part in his continued shortness of breath.  He is also having significant bruising in bilateral arms with history of falls and Dr. Domínguez thought that he could be a good candidate for a Watchman procedure.  He recently had a fall about a month ago and he fractured his a rib.  He is on Eliquis 5 mg b.i.d. currently.  His CHADS-VASc score is 4.        5/9/24: Here for follow up. He is scheduled for Amulet next week, He had a Holter done to assess his rates and it showed 16 pauses up to 2.2. seconds. Patient is minimally symptomatic. He has permanent atrial fibrillation ans is on metoprolol only for rate control.    CARDIAC HISTORY  CAD:  1 CABG - 2013     ARRHYTHMIA:  1 A Fib - 2023     RISK FACTORS:  1 Diabetes   2 Hypertension   3 Dyslipidemia   4 Family History of CAD [Less than 60 years of age]   5 Tobacco Use       CARDIOVASCULAR PROCEDURES  Procedure Date Results   Cardiac CTA 04/23/2024 FARZANEH orifice measures 32 x 24 mm   Ascending aorta is dilated and measures 43 mm.   Cardioversion 01/01/2013    CV Surgery  1V CABG: 
cell category consists of squamous cells and /or transitional urothelial cells. Renal tubular cells, if present, are separately identified as such.  BACTERIA, URINE                               Date: 07/12/2024  Value: Negative    Ref range: NEG /hpf           Status: Final  Urine Culture if Indicated                    Date: 07/12/2024  Value: CULTURE NOT INDICATED BY UA RESULT                     Ref range: CNI                Status: Final  Hyaline Casts, UA                             Date: 07/12/2024  Value: 0-2         Ref range: 0 - 2 /lpf         Status: Final  Crossmatch expiration date                    Date: 07/12/2024  Value: 07/25/2024,2359                       Status: Final  ABO/Rh                                        Date: 07/12/2024  Value: O POSITIVE    Status: Final  Antibody Screen                               Date: 07/12/2024  Value: NEG           Status: Final  Unit Number                                   Date: 07/12/2024  Value: [id number]                       Status: Final  Product Code Blood Bank                       Date: 07/12/2024  Value: RC LR,2       Status: Final  Unit Divison                                  Date: 07/12/2024  Value: 00            Status: Final  Dispense Status Blood Bank                    Date: 07/12/2024  Value: ALLOCATED     Status: Preliminary  Crossmatch Result                             Date: 07/12/2024  Value: Compatible    Status: Final  Unit Number                                   Date: 07/12/2024  Value: [id number]                       Status: Final  Product Code Blood Bank                       Date: 07/12/2024  Value: RC LR         Status: Final  Unit Divison                                  Date: 07/12/2024  Value: 00            Status: Final  Dispense Status Blood Bank                    Date: 07/12/2024  Value: ALLOCATED     Status: Preliminary  Crossmatch Result                             Date: 07/12/2024  Value: Compatible    Status:

## 2024-07-22 NOTE — ANESTHESIA POSTPROCEDURE EVALUATION
Department of Anesthesiology  Postprocedure Note    Patient: Chet Jacobs  MRN: [id number]  YOB: 1945  Date of evaluation: 7/22/2024    Procedure Summary       Date: 07/22/24 Room / Location: [facility] EP LAB 2 / [facility] CARDIAC CATH LAB    Anesthesia Start: 1335 Anesthesia Stop: 1506    Procedure: Watchman dinesh closure device Diagnosis: Persistent atrial fibrillation (HCC)    Providers: Dion Mauro MD Responsible Provider: Orly Marie DO    Anesthesia Type: General ASA Status: 3            Anesthesia Type: General    Bennett Phase I: Bennett Score: 9    Bennett Phase II:      Anesthesia Post Evaluation    Patient location during evaluation: PACU  Patient participation: complete - patient participated  Level of consciousness: awake and alert  Airway patency: patent  Nausea & Vomiting: no nausea  Cardiovascular status: hemodynamically stable  Respiratory status: acceptable  Hydration status: euvolemic  Pain management: adequate    There were no known notable events for this encounter.

## 2024-07-22 NOTE — ANESTHESIA PROCEDURE NOTES
Procedure Performed: ALLY       Start Time:        End Time:      Preanesthesia Checklist:  Patient identified, IV assessed, risks and benefits discussed, monitors and equipment assessed, procedure being performed at surgeon's request and anesthesia consent obtained.    General Procedure Information  Diagnostic Indications for Echo:  assessment of surgical repair and hemodynamic monitoring  Location performed:  OR  Intubated  Bite block placed  Heart visualized  Probe Insertion:  Easy  Probe Type:  Biplane and mulitplane  Modalities:  2D and color flow mapping    Echocardiographic and Doppler Measurements    Ventricles    Right Ventricle:  Cavity size normal.  Hypertrophy not present.  Thrombus not present.  Global function normal.    Left Ventricle:  Cavity size normal.  Hypertrophy present.  Thrombus not present.  Global Function normal.  Ejection Fraction 50%.                  Atria    Right Atrium:  Size normal.  Spontaneous echo contrast not present.  Thrombus not present.  Tumor not present.  Device not present.      Left Atrium:  Size normal.  Spontaneous echo contrast not present.  Thrombus not present.  Tumor not present.  Device not present.    Left atrial appendage normal.  Other Atria Findings:       FARZANEH max ostium 20mm    Septa    Atrial Septum:  Intra-atrial septal morphology normal.      Ventricular Septum:  Intra-ventricular septum morphology normal.          Other Findings  Pericardium:  pericardial effusion      Anesthesia Information  Performed Personally  Anesthesiologist:  Orly Marie, DO      Echocardiogram Comments:       Limited ALLY for Watchman device placement. Suboptimal anatomy and imaging. Measurements of the FARZANEH taken pre and post procedure and satisfactory. Device in good position without evidence of color flow around device, from best possible windows. Contrast photo was obtained to confirm.  IAS with L to R shunt as expected after IAS puncture. Trivial 2mm pericardial effusion

## 2024-07-22 NOTE — FLOWSHEET NOTE
07/22/24 1545   Handoff   Communication Given Transfer Handoff   Handoff Given To Sindy TOM   Handoff Received From Chandler TOM   Handoff Communication Face to Face;At bedside;Telephone   Time Handoff Given 1550     PT transported by RN, monitor, NC 4L  Incision site assessed with receiving RN

## 2024-07-22 NOTE — FLOWSHEET NOTE
07/22/24 1503   Handoff   Communication Given Transfer Handoff   Handoff Given To Chandler TOM   Handoff Received From Zaki Huang/Adia RN   Handoff Communication Face to Face;At bedside   Time Handoff Given 1503

## 2024-07-22 NOTE — PROGRESS NOTES
Cardiac Cath Lab Recovery Arrival Note:      Chet Jacobs arrived to Cardiac Cath Lab, Recovery Area. Staff introduced to patient. Patient identifiers verified with NAME and DATE OF BIRTH. Procedure verified with patient. Consent forms reviewed and signed by patient or authorized representative and verified. Allergies verified.     Patient and family oriented to department. Patient and family informed of procedure and plan of care.     Questions answered with review. Patient prepped for procedure, per orders from physician, prior to arrival.    Patient on cardiac monitor, non-invasive blood pressure, SPO2 monitor. On RA. Patient is A&Ox 4. Patient reports no pain.     Patient in stretcher, in low position, with side rails up, call bell within reach, patient instructed to call if assistance as needed.    Patient prep in: [facility] Recovery Area, [place] [facility].     Family in: Riri, wife.   Prep by: Cinda

## 2024-07-22 NOTE — PROGRESS NOTES
Pt ready for discharge. RN reviewed all new medications and where to  the prescriptions. RN reviewed how to follow up with the cardiologist. RN reviewed right groin site care instructions. Pt verbalized understanding. RN provided time to answer any and all questions.

## 2024-07-22 NOTE — PROGRESS NOTES
Patient arrived to IVCU from EP lab post Watchman. Right fem site CDI, no bleeding/hematoma full sensation in right leg and palpable pulses VSS, NSR/ SB 2L O2 NC, Afebrile. Patient on bedrest.

## 2024-07-22 NOTE — PLAN OF CARE
Problem: Safety - Adult  Goal: Free from fall injury  7/22/2024 1943 by Sindy Willett RN  Outcome: Adequate for Discharge  7/22/2024 1932 by Sindy Willett RN  Outcome: Adequate for Discharge     Problem: Discharge Planning  Goal: Discharge to home or other facility with appropriate resources  7/22/2024 1943 by Sindy Willett RN  Outcome: Adequate for Discharge  7/22/2024 1932 by Sindy Willett RN  Outcome: Adequate for Discharge  Flowsheets (Taken 7/22/2024 1557)  Discharge to home or other facility with appropriate resources: Identify discharge learning needs (meds, wound care, etc)

## 2024-07-22 NOTE — PLAN OF CARE
Problem: Safety - Adult  Goal: Free from fall injury  Outcome: Adequate for Discharge     Problem: Discharge Planning  Goal: Discharge to home or other facility with appropriate resources  Outcome: Adequate for Discharge  Flowsheets (Taken 7/22/2024 2055)  Discharge to home or other facility with appropriate resources: Identify discharge learning needs (meds, wound care, etc)

## 2024-09-13 ENCOUNTER — HOSPITAL ENCOUNTER (OUTPATIENT)
Facility: HOSPITAL | Age: 79
Discharge: HOME | End: 2024-09-15
Attending: INTERNAL MEDICINE
Payer: MEDICARE

## 2024-09-13 VITALS
RESPIRATION RATE: 19 BRPM | DIASTOLIC BLOOD PRESSURE: 91 MMHG | HEART RATE: 71 BPM | BODY MASS INDEX: 41.61 KG/M2 | OXYGEN SATURATION: 92 % | WEIGHT: 290 LBS | SYSTOLIC BLOOD PRESSURE: 130 MMHG

## 2024-09-13 DIAGNOSIS — Z95.818 PRESENCE OF WATCHMAN LEFT ATRIAL APPENDAGE CLOSURE DEVICE: ICD-10-CM

## 2024-09-13 PROCEDURE — 6360000002 HC RX W HCPCS: Performed by: INTERNAL MEDICINE

## 2024-09-13 PROCEDURE — 6370000000 HC RX 637 (ALT 250 FOR IP): Performed by: INTERNAL MEDICINE

## 2024-09-13 PROCEDURE — 93325 DOPPLER ECHO COLOR FLOW MAPG: CPT

## 2024-09-13 RX ORDER — MIDAZOLAM HYDROCHLORIDE 1 MG/ML
INJECTION INTRAMUSCULAR; INTRAVENOUS PRN
Status: COMPLETED | OUTPATIENT
Start: 2024-09-13 | End: 2024-09-13

## 2024-09-13 RX ORDER — FENTANYL CITRATE 50 UG/ML
INJECTION, SOLUTION INTRAMUSCULAR; INTRAVENOUS PRN
Status: COMPLETED | OUTPATIENT
Start: 2024-09-13 | End: 2024-09-13

## 2024-09-13 RX ORDER — CINACALCET 30 MG/1
30 TABLET, FILM COATED ORAL DAILY
COMMUNITY

## 2024-09-13 RX ORDER — LIDOCAINE HYDROCHLORIDE 20 MG/ML
SOLUTION OROPHARYNGEAL PRN
Status: COMPLETED | OUTPATIENT
Start: 2024-09-13 | End: 2024-09-13

## 2024-09-13 RX ADMIN — MIDAZOLAM HYDROCHLORIDE 2 MG: 1 INJECTION, SOLUTION INTRAMUSCULAR; INTRAVENOUS at 10:40

## 2024-09-13 RX ADMIN — FENTANYL CITRATE 25 MCG: 50 INJECTION, SOLUTION INTRAMUSCULAR; INTRAVENOUS at 10:40

## 2024-09-13 RX ADMIN — LIDOCAINE HYDROCHLORIDE 15 ML: 20 SOLUTION ORAL at 10:32

## 2024-09-13 RX ADMIN — BENZOCAINE, BUTAMBEN, AND TETRACAINE HYDROCHLORIDE 2 SPRAY: .028; .004; .004 AEROSOL, SPRAY TOPICAL at 10:33

## 2025-06-28 ENCOUNTER — TRANSCRIBE ORDERS (OUTPATIENT)
Facility: HOSPITAL | Age: 80
End: 2025-06-28

## 2025-06-28 DIAGNOSIS — M54.6 THORACIC SPINE PAIN: Primary | ICD-10-CM

## 2025-07-22 ENCOUNTER — HOSPITAL ENCOUNTER (OUTPATIENT)
Age: 80
Discharge: HOME | End: 2025-07-25
Payer: MEDICARE

## 2025-07-22 DIAGNOSIS — M54.6 THORACIC SPINE PAIN: ICD-10-CM

## 2025-07-22 PROCEDURE — 72146 MRI CHEST SPINE W/O DYE: CPT

## (undated) DEVICE — HEART CATH-MRMC: Brand: MEDLINE INDUSTRIES, INC.

## (undated) DEVICE — PINNACLE INTRODUCER SHEATH: Brand: PINNACLE

## (undated) DEVICE — 3M™ TEGADERM™ TRANSPARENT FILM DRESSING FRAME STYLE, 1626W, 4 IN X 4-3/4 IN (10 CM X 12 CM), 50/CT 4CT/CASE: Brand: 3M™ TEGADERM™

## (undated) DEVICE — 1 X VERSACROSS CONNECT TRANSSEPTAL DILATOR (INCLUDING 1 X J-TIP MECHANICAL GUIDEWIRE); 1 X VERSACROSS RF WIRE (INCLUDING 1 X CONNECTOR CABLE (SINGLE USE)); 1 X DISPERSIVE ELECTRODE: Brand: VERSACROSS CONNECT LAAC ACCESS SOLUTION

## (undated) DEVICE — MEDI-TRACE CADENCE ADULT, DEFIBRILLATION ELECTRODE -RTS  (10 PR/PK) - PHYSIO-CONTROL: Brand: MEDI-TRACE CADENCE

## (undated) DEVICE — TUBING PRSS MON L6IN PVC M FEM CONN

## (undated) DEVICE — ELECTRODE PT RET AD L9FT HI MOIST COND ADH HYDRGEL CORDED

## (undated) DEVICE — SUTURE VICRYL SZ 1 L36IN ABSRB VLT L48MM CTX 1/2 CIR J371H

## (undated) DEVICE — CATHETER ANGIO JR4 PIG 145 DEG 6 FRX100 CM MP SUPER TORQUE +

## (undated) DEVICE — CHECK-FLO INTRODUCER SET: Brand: PERFORMER

## (undated) DEVICE — ACCESS SHEATH WITH DILATOR: Brand: WATCHMAN FXD CURVE™ ACCESS SYSTEM